# Patient Record
Sex: MALE | Race: WHITE | Employment: FULL TIME | URBAN - METROPOLITAN AREA
[De-identification: names, ages, dates, MRNs, and addresses within clinical notes are randomized per-mention and may not be internally consistent; named-entity substitution may affect disease eponyms.]

---

## 2024-10-31 ENCOUNTER — HOSPITAL ENCOUNTER (EMERGENCY)
Age: 24
Discharge: HOME | End: 2024-10-31
Payer: MEDICAID

## 2024-10-31 VITALS
SYSTOLIC BLOOD PRESSURE: 158 MMHG | HEIGHT: 69 IN | OXYGEN SATURATION: 98 % | DIASTOLIC BLOOD PRESSURE: 89 MMHG | TEMPERATURE: 97.7 F | WEIGHT: 215 LBS | RESPIRATION RATE: 18 BRPM | HEART RATE: 82 BPM | BODY MASS INDEX: 31.84 KG/M2

## 2024-10-31 DIAGNOSIS — S61.212A LACERATION OF RIGHT MIDDLE FINGER WITHOUT FOREIGN BODY WITHOUT DAMAGE TO NAIL, INITIAL ENCOUNTER: Primary | ICD-10-CM

## 2024-10-31 PROCEDURE — 12001 RPR S/N/AX/GEN/TRNK 2.5CM/<: CPT

## 2024-10-31 PROCEDURE — 2500000003 HC RX 250 WO HCPCS: Performed by: PHYSICIAN ASSISTANT

## 2024-10-31 PROCEDURE — 99282 EMERGENCY DEPT VISIT SF MDM: CPT

## 2024-10-31 RX ORDER — LIDOCAINE HYDROCHLORIDE 10 MG/ML
20 INJECTION, SOLUTION INFILTRATION; PERINEURAL ONCE
Status: COMPLETED | OUTPATIENT
Start: 2024-10-31 | End: 2024-10-31

## 2024-10-31 RX ADMIN — LIDOCAINE HYDROCHLORIDE 20 ML: 10 INJECTION, SOLUTION INFILTRATION; PERINEURAL at 20:42

## 2024-10-31 ASSESSMENT — PAIN DESCRIPTION - LOCATION: LOCATION: HAND

## 2024-10-31 ASSESSMENT — LIFESTYLE VARIABLES
HOW OFTEN DO YOU HAVE A DRINK CONTAINING ALCOHOL: NEVER
HOW MANY STANDARD DRINKS CONTAINING ALCOHOL DO YOU HAVE ON A TYPICAL DAY: PATIENT DOES NOT DRINK

## 2024-10-31 ASSESSMENT — PAIN - FUNCTIONAL ASSESSMENT: PAIN_FUNCTIONAL_ASSESSMENT: 0-10

## 2024-10-31 ASSESSMENT — PAIN DESCRIPTION - DESCRIPTORS: DESCRIPTORS: DISCOMFORT;DULL

## 2024-10-31 ASSESSMENT — PAIN SCALES - GENERAL: PAINLEVEL_OUTOF10: 6

## 2024-10-31 ASSESSMENT — PAIN DESCRIPTION - ORIENTATION: ORIENTATION: RIGHT

## 2024-10-31 NOTE — ED PROVIDER NOTES
Neck:  Normal ROM. Supple. Non-tender.  Extremities: 2 cm laceration to right middle finger, dorsal aspect just distal to MCP joint, full ROM, intact sensations distally, strong radial pulse, abrasion to 4th finger with no active bleeding, full ROM of all 5 fingers, able to make fist and \"ok\" sign    Lymphatics: No lymphangitis or adenopathy noted.  Neurological: CN II-XII grossly intact, Motor functions intact.    Lab / Imaging Results   (All laboratory and radiology results have been personally reviewed by myself)  Labs:  No results found for this visit on 10/31/24.    Imaging:  All Radiology results interpreted by Radiologist unless otherwise noted.  No orders to display     Procedure(s):  PROCEDURE NOTE      LACERATION REPAIR  Risks, benefits and alternatives (for applicable procedures below) described.   Performed By: Clover Burgess PA-C.    Laceration #: 1.  Location: middle finger of right hand   Length: 2cm.  The wound area was irrigated with sterile saline, cleansed with povidone iodine, cleansed with chlorhexidine gluconate, cleansend with shur-clens and draped in a sterile fashion.  Local Anesthesia:  Lidocaine 1% without epinephrine.  The wound was explored with the following results:  no foreign body or tendon injury seen.  Flaps Aligned: yes.  The wound was closed with 5-0 Ethilon using interrupted sutures.  Dressing:  bacitracin and a sterile dressing was placed.    Total number suture:  6.    There were no additional lacerations requiring repair.       -- MEDICAL DECISION MAKING --   History From: History from : Patient  Limitations to history : None    Record Review:  Outpatient Notes reviewed  Other Records Reviewed : None    CC/HPI Summary, DDx, ED Course, and Reassessment:   Patient presents to the ED for Laceration (Pt came to ER with a C/O right finger laceration located on the middle finger. Cut his finger on a rig while running tests  )    This is a 23-year-old male presents emergency  department for a laceration to right middle finger.  Wound was thoroughly cleaned and irrigated.  The wound was repaired with six 5-0 Ethilon sutures.  Patient educated on wound care and signs symptoms of infection that would require his emergent return to the ED.  Motrin/Tylenol as needed for pain.  Patient will be given a note for work tomorrow.  He was in no distress at discharge and able to ambulate in department no difficulty.  Vital stable.    Patient was given the following medications:  Medications   lidocaine 1 % injection 20 mL (has no administration in time range)      Reassessment:  Patient continues to be non-toxic on re-evaluation.     Chronic Conditions:   No past medical history on file.    ED COURSE:      Any labs and imaging were reviewed by myself.     Consultations:  None  Discussion with Other Profesionals : None    COUNSELING:   I have spoken with the patient/caregiver and discussed today’s results, in addition to providing specific details for the plan of care and counseling regarding the diagnosis and prognosis and are agreeable with the plan. All results reviewed with pt and all questions answered.  I discussed the differential, results and discharge plan with the patient and/or family/friend/caregiver if present.  I emphasized the importance of follow-up with the physician I referred them to in the timeframe recommended.  I explained reasons for the patient to return to the Emergency Department. Additional verbal discharge instructions were also given and discussed with the patient to supplement those generated by the EMR. We also discussed medications that were prescribed (if any) including common side effects and interactions. All questions were addressed.  They understand return precautions and discharge instructions. The patient and/or family/friend/caregiver expressed understanding. Vitals were stable and they were in no distress at discharge. Findings were discussed with the patient

## 2024-10-31 NOTE — DISCHARGE INSTRUCTIONS
Laceration Care  Please keep wound covered with sterile dressing while at work or in public.  Use Bacitracin or antibiotic ointment for only 2 days after the initial injury, otherwise, keep wound clean and dry.    Don't soak wound in water. Do not use Peroxide or Alcohol to clean wound. Just use warm soapy water   Return to ED for wound check if wound becomes more painful or begins to drain yellow, thick discharge. Return with any increase in pain or swelling, inability to move extremity, or development of red streaking or fever.

## 2024-11-07 ENCOUNTER — OFFICE VISIT (OUTPATIENT)
Dept: PRIMARY CARE CLINIC | Age: 24
End: 2024-11-07
Payer: MEDICAID

## 2024-11-07 VITALS
RESPIRATION RATE: 16 BRPM | OXYGEN SATURATION: 96 % | DIASTOLIC BLOOD PRESSURE: 78 MMHG | WEIGHT: 225 LBS | HEART RATE: 68 BPM | HEIGHT: 66 IN | BODY MASS INDEX: 36.16 KG/M2 | TEMPERATURE: 98.7 F | SYSTOLIC BLOOD PRESSURE: 125 MMHG

## 2024-11-07 DIAGNOSIS — S61.212D LACERATION OF RIGHT MIDDLE FINGER WITHOUT FOREIGN BODY WITHOUT DAMAGE TO NAIL, SUBSEQUENT ENCOUNTER: Primary | ICD-10-CM

## 2024-11-07 DIAGNOSIS — Z48.02 VISIT FOR SUTURE REMOVAL: ICD-10-CM

## 2024-11-07 PROCEDURE — 99203 OFFICE O/P NEW LOW 30 MIN: CPT | Performed by: NURSE PRACTITIONER

## 2024-11-07 NOTE — PROGRESS NOTES
Chief Complaint:   Wound Check (With removal been one week )    History of Present Illness   Source of history provided by:  patient.    Marie Hathaway is a 23 y.o. male who presents to walk-in for reevaluation of the wound as well as removal of sutures from the right middle finger. The patient sustained a laceration 7 days ago. Patient states that the wound has been healing well without evidence of erythema, purulent drainage, wound dehiscence, or increased pain. The patient denies a sensation of foreign body. The patient denies any fevers or chills.     Review of Systems   Unless otherwise stated in this report or unable to obtain because of the patient's clinical or mental status as evidenced by the medical record, this patients's positive and negative responses for Review of Systems, constitutional, psych, eyes, ENT, cardiovascular, respiratory, gastrointestinal, neurological, genitourinary, musculoskeletal, integument systems and systems related to the presenting problem are either stated in the preceding or were not pertinent or were negative for the symptoms and/or complaints related to the medical problem.    Past Medical History:  has no past medical history on file.   Past Surgical History:  has no past surgical history on file.  Social History:  reports that he has never smoked. He has never used smokeless tobacco. He reports that he does not use drugs.  Family History: family history is not on file.  Allergies: Patient has no known allergies.    Physical Exam   Vital Signs:  /78   Pulse 68   Temp 98.7 °F (37.1 °C) (Oral)   Resp 16   Ht 1.676 m (5' 6\")   Wt 102.1 kg (225 lb)   SpO2 96%   BMI 36.32 kg/m²    Oxygen Saturation Interpretation: Normal.    Constitutional:  Alert, development consistent with age.  HEENT:  NC/NT.  Airway patent.  External ears normal.   Neck:  Normal ROM.  Supple.  Lungs:  Clear to auscultation and breath sounds equal bilaterally.    CV: Regular rate and rhythm,